# Patient Record
Sex: MALE
[De-identification: names, ages, dates, MRNs, and addresses within clinical notes are randomized per-mention and may not be internally consistent; named-entity substitution may affect disease eponyms.]

---

## 2019-11-08 ENCOUNTER — HOSPITAL ENCOUNTER (EMERGENCY)
Dept: HOSPITAL 97 - ER | Age: 54
Discharge: HOME | End: 2019-11-08
Payer: SELF-PAY

## 2019-11-08 VITALS — OXYGEN SATURATION: 97 % | TEMPERATURE: 97.5 F | DIASTOLIC BLOOD PRESSURE: 94 MMHG | SYSTOLIC BLOOD PRESSURE: 116 MMHG

## 2019-11-08 DIAGNOSIS — G40.509: ICD-10-CM

## 2019-11-08 DIAGNOSIS — M47.896: ICD-10-CM

## 2019-11-08 DIAGNOSIS — S32.9XXA: Primary | ICD-10-CM
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
## 2019-11-08 LAB
ALBUMIN SERPL BCP-MCNC: 3.9 G/DL (ref 3.4–5)
ALP SERPL-CCNC: 100 U/L (ref 45–117)
ALT SERPL W P-5'-P-CCNC: 24 U/L (ref 12–78)
AST SERPL W P-5'-P-CCNC: 13 U/L (ref 15–37)
BUN BLD-MCNC: 12 MG/DL (ref 7–18)
GLUCOSE SERPLBLD-MCNC: 149 MG/DL (ref 74–106)
HCT VFR BLD CALC: 48.6 % (ref 39.6–49)
LYMPHOCYTES # SPEC AUTO: 1 K/UL (ref 0.7–4.9)
PMV BLD: 10.5 FL (ref 7.6–11.3)
POTASSIUM SERPL-SCNC: 4.1 MMOL/L (ref 3.5–5.1)
RBC # BLD: 5.77 M/UL (ref 4.33–5.43)

## 2019-11-08 PROCEDURE — 72125 CT NECK SPINE W/O DYE: CPT

## 2019-11-08 PROCEDURE — 70450 CT HEAD/BRAIN W/O DYE: CPT

## 2019-11-08 PROCEDURE — 99285 EMERGENCY DEPT VISIT HI MDM: CPT

## 2019-11-08 PROCEDURE — 72220 X-RAY EXAM SACRUM TAILBONE: CPT

## 2019-11-08 PROCEDURE — 72100 X-RAY EXAM L-S SPINE 2/3 VWS: CPT

## 2019-11-08 PROCEDURE — 96374 THER/PROPH/DIAG INJ IV PUSH: CPT

## 2019-11-08 PROCEDURE — 36415 COLL VENOUS BLD VENIPUNCTURE: CPT

## 2019-11-08 PROCEDURE — 72170 X-RAY EXAM OF PELVIS: CPT

## 2019-11-08 PROCEDURE — 96361 HYDRATE IV INFUSION ADD-ON: CPT

## 2019-11-08 PROCEDURE — 80053 COMPREHEN METABOLIC PANEL: CPT

## 2019-11-08 PROCEDURE — 85025 COMPLETE CBC W/AUTO DIFF WBC: CPT

## 2019-11-08 PROCEDURE — 93005 ELECTROCARDIOGRAM TRACING: CPT

## 2019-11-08 NOTE — ER
Nurse's Notes                                                                                     

 [facility]                                                                 

Name: Saurav Maradiaga                                                                             

Age: 54 yrs                                                                                       

Sex: Male                                                                                         

: 1965                                                                                   

MRN: [id number]                                                                                   

Arrival Date: 2019                                                                          

Time: 09:22                                                                                       

Account#: [id number]                                                                            

Bed 2                                                                                             

Private MD:                                                                                       

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       

  causes;Fracture of unspecified parts of lumbosacral spine and pelvis;Spondylolysis,             

  lumbar region                                                                                   

                                                                                                  

Presentation:                                                                                     

                                                                                             

09:23 Presenting complaint: EMS states: FALL ONTO SACRUM, WITNESSED SZ TWO MIN S/P FALL WITH  bp  

      LOSS OF CONTINENCE. Care prior to arrival: Oxygen administered. Mechanism of Injury:        

      Fall from standing position. Trauma event details: Injury occurred in the [facility], Injury occurred: at home. Injury occurred: 2019 Injury occurred      

      at: 08:45.                                                                                  

09:23 Acuity: DAVEY 3                                                                           bp  

09:23 Method Of Arrival: EMS: [facility]                                                    bp  

09:48 Transition of care: patient was not received from another setting of care. Onset of     bp  

      symptoms is unknown. Risk Assessment: Do you want to hurt yourself or someone else?         

      Patient reports no desire to harm self or others. Initial Sepsis Screen: Does the           

      patient meet any 2 criteria? No. Patient's initial sepsis screen is negative. Does the      

      patient have a suspected source of infection? No. Patient's initial sepsis screen is        

      negative.                                                                                   

                                                                                                  

Trauma Activation: Not Applicable                                                                 

 Physician: ED Physician; Name: ; Notified At: ; Arrived At:                                      

 Physician: General Surgeon; Name: ; Notified At: ; Arrived At:                                   

 Physician: Radiology; Name: ; Notified At: ; Arrived At:                                         

 Physician: Respiratory; Name: ; Notified At: ; Arrived At:                                       

 Physician: Lab; Name: ; Notified At: ; Arrived At:                                               

                                                                                                  

Historical:                                                                                       

- Allergies:                                                                                      

09:46 No Known Allergies;                                                                     bp  

- Home Meds:                                                                                      

09:46 None [Active];                                                                          bp  

- PMHx:                                                                                           

09:46 None;                                                                                   bp  

                                                                                                  

- Immunization history: Last tetanus immunization: unknown.                                       

- Social history:: Smoking status: Patient/guardian denies using tobacco.                         

- Ebola Screening: : No symptoms or risks identified at this time.                                

- Family history:: not pertinent.                                                                 

                                                                                                  

                                                                                                  

Screenin:31 Abuse screen: Denies threats or abuse. Denies injuries from another. Tuberculosis       bp  

      screening: No symptoms or risk factors identified.                                          

09:49 Nutritional screening: No deficits noted. Fall Risk Fall in past 12 months (25 points). bp  

                                                                                                  

Primary Survey:                                                                                   

09:31 NO uncontrolled hemorrhage observed. Breathing/Chest: Respiratory pattern: regular.     bp  

      Circulation: Skin temperature:. Disability Alert. Exposure/Environment: All clothing        

      and personal items were removed. Forensic evidence collection is not deemed to be           

      indicated at this time. Items placed in patient belonging bag. There is no evidence of      

      uncontrolled external bleeding.                                                             

11:46 Reassessment Airway Airway Patent Breathing/Chest Respiratory pattern Regular           bp  

      Respiratory effort Spontaneous Unlabored.                                                   

                                                                                                  

Secondary Survey:                                                                                 

09:31 HEENT: No deficits noted.                                                               bp  

                                                                                                  

Assessment:                                                                                       

09:23 General: Appears in no apparent distress. comfortable, Behavior is calm, cooperative,   bp  

      appropriate for age. Pain: Complains of pain in buttocks. Neuro: Level of Consciousness     

      is awake, alert, obeys commands, Oriented to person, place, time, situation,                

      Appropriate for age  are equal bilaterally Speech is normal, Facial symmetry           

      appears normal, Pupils are PERRLA. EENT: No deficits noted. Cardiovascular: Rhythm is       

      sinus rhythm. Respiratory: Airway is patent. GI: No signs and/or symptoms were reported     

      involving the gastrointestinal system. : No deficits noted. Derm: No deficits noted.      

      Musculoskeletal: No deficits noted.                                                         

09:44 Pain: Pain currently is 5 out of 10 on a pain scale. Quality of pain is described as    ss  

      aching, tender, Pain began suddenly, after fall Is continuous.                              

10:06 Reassessment: PT TO RADIOLOGY.                                                          bp  

11:09 Reassessment: PT RETURNED FROM RADIOLOGY.                                               bp  

11:45 Reassessment: PT D/C HOME AMBULATORY, DX WITH FALL AND FX OF INFERIOR LUMBOSACRAL SPINE.bp  

                                                                                                  

Vital Signs:                                                                                      

09:31  / 103; Pulse 74; Resp 20; Temp 98.5; Pulse Ox 100% ; Weight 108.86 kg;           bp  

11:08  / 100; Pulse 63; Resp 16; Pulse Ox 98% ;                                         bp  

11:45  / 94; Pulse 65; Resp 16; Temp 97.5; Pulse Ox 97% ;                               bp  

                                                                                                  

Perfecto Coma Score:                                                                               

09:31 Eye Response: spontaneous(4). Verbal Response: oriented(5). Motor Response: obeys       bp  

      commands(6). Total: 15.                                                                     

                                                                                                  

Trauma Score (Adult):                                                                             

09:31 Eye Response: spontaneous(1); Verbal Response: oriented(1); Motor Response: obeys       bp  

      commands(2); Systolic BP: > 89 mm Hg(4); Respiratory Rate: 10 to 29 per min(4); [place]     

      Score: 15; Trauma Score: 12                                                                 

                                                                                                  

ED Course:                                                                                        

09:22 Patient arrived in ED.                                                                  bp  

09:25 Triage completed.                                                                       bp  

09:26 Kuldeep Ruffin MD is Attending Physician.                                             mali 

09:31 Oxygen administration via nasal cannula \T\ 2L/min.                                       bp  

09:31 Patient has correct armband on for positive identification. Bed in low position. Call   bp  

      light in reach. Side rails up X2.                                                           

09:32 EKG done, by EKG tech. reviewed by Kuldeep Ruffin MD.                                   sm3 

09:34 Sobia Fournier, RN is Primary Nurse.                                                    ss  

09:49 Thermoregulation: warm blanket given to patient.                                        bp  

09:49 Arm band placed on.                                                                     bp  

10:12 CT Head C Spine In Process Unspecified.                                                 EDMS

11:01 Lumbar Spine (3 Views) XRAY In Process Unspecified.                                     EDMS

11:01 Sacrum And Coccyx XRAY In Process Unspecified.                                          EDMS

11:01 Pelvis XRAY In Process Unspecified.                                                     EDMS

11:22 Rajiv Valero MD is Referral Physician.                                             mali 

11:23 Uvaldo Fried MD is Referral Physician.                                              mali 

11:44 No provider procedures requiring assistance completed. IV discontinued, intact,         bp  

      bleeding controlled, No redness/swelling at site. Pressure dressing applied.                

                                                                                                  

Administered Medications:                                                                         

09:53 Drug: NS 0.9% 500 ml Route: IV; Rate: bolus; Site: left antecubital;                    bp  

11:44 Follow up: IV Status: Completed infusion; IV Intake: 500ml                              bp  

10:00 Drug: TORadol 30 mg Route: IVP; Site: left antecubital;                                 bp  

11:00 Follow up: Response: Pain is decreased                                                  bp  

                                                                                                  

                                                                                                  

Intake:                                                                                           

09:31 PO: 0ml; Total: 0ml.                                                                    bp  

11:44 IV: 500ml; Total: 500ml.                                                                bp  

                                                                                                  

Output:                                                                                           

09:31 Urine: 0ml; Total: 0ml.                                                                 bp  

                                                                                                  

Outcome:                                                                                          

11:23 Discharge ordered by MD.                                                                mali 

11:45 Discharged to home ambulatory.                                                          bp  

11:45 Condition: stable                                                                           

11:45 Discharge instructions given to patient, Instructed on discharge instructions, follow       

      up and referral plans. medication usage, Demonstrated understanding of instructions,        

      follow-up care, medications, Prescriptions given X 2.                                       

11:46 Patient's length of stay was not longer than 2 hours.                                   bp  

12:14 Patient left the ED.                                                                    bp  

                                                                                                  

Signatures:                                                                                       

Dispatcher [employer]                           EDMS                                                 

Kuldeep Ruffin MD MD cha Smirch, Shelby, RN                      RN   ss                                                   

Jacob Garcia RN                      RN   bp                                                   

Fadumo Butler                              3                                                  

                                                                                                  

Corrections: (The following items were deleted from the chart)                                    

09:39 09:31  / 103; Pulse 74bpm; Resp 20bpm; Pulse Ox 100%; Temp 98.5F; bp              bp  

09:48 09:47 Reassessment Breathing/Chest bp                                                   bp  

                                                                                                  

**************************************************************************************************

## 2019-11-08 NOTE — RAD REPORT
EXAM DESCRIPTION:  RAD - Pelvis - 11/8/2019 10:53 am

 

CLINICAL HISTORY:  PAIN

 

COMPARISON:  No comparisons

 

FINDINGS:  No fracture, dislocation or radiographic evidence of AVN.

 

IMPRESSION:  Negative study.

## 2019-11-08 NOTE — RAD REPORT
EXAM DESCRIPTION:  CT - [id number] - 11/8/2019 10:12 am

 

CLINICAL HISTORY:  Trauma, head and neck injury.

Pain;Weakness

 

COMPARISON:  No comparisons

 

TECHNIQUE:  Axial 5 mm thick images of the head were obtained.

 

Axial 2 mm thick images of the cervical spine were obtained with sagittal and coronal reconstruction 
images generated and reviewed.

 

All CT scans are performed using dose optimization technique as appropriate and may include automated
 exposure control or mA/KV adjustment according to patient size.

 

FINDINGS:  CT HEAD WITHOUT CONTRAST:

 

No acute hemorrhage, hydrocephalus or extra-axial collection is identified.No areas of brain edema or
 midline shift.

 

The paranasal sinuses and mastoids are clear.The calvarium is intact.

 

CT CERVICAL SPINE WITHOUT CONTRAST:

 

No fracture or subluxation.Mild lower cervical spondylosis.No prevertebral soft tissues swelling is i
dentified.

 

IMPRESSION:  No acute intracranial or cervical spine findings.

 

Mild lower cervical degenerative change.

## 2019-11-08 NOTE — RAD REPORT
EXAM DESCRIPTION:  RAD - Sacrum And Coccyx - 11/8/2019 10:53 am

 

CLINICAL HISTORY:  PAIN

 

COMPARISON:  Lumbar Spine 3 Views dated 11/8/2019

 

FINDINGS:  Bilateral spondylolysis with mild anterolisthesis at L5-S1. There is mild offset at the sa
crococcygeal articulation of 5 mm with thin lucency seen in the very inferior aspect of the sacrum, l
ikely related to fracture with mild subluxation.

## 2019-11-08 NOTE — RAD REPORT
EXAM DESCRIPTION:  RAD - Lumbar Spine 3 Views - 11/8/2019 10:53 am

 

CLINICAL HISTORY:  PAIN

Radiculopathy

 

COMPARISON:  No comparisons

 

FINDINGS:  Vertebral body heights appear maintained. No compression fracture noted. Prominent disc th
inning with vacuum disc degeneration noted at L5-S1. Bilateral spondylolysis with grade 1 anterolisth
esis of L5 on S1.

 

Cholecystectomy clips.

 

IMPRESSION:  Moderate L5-S1 spondylosis as detailed.

## 2019-11-08 NOTE — EDPHYS
Physician Documentation                                                                           

 [facility]                                                                 

Name: Saurav Maradiaga                                                                             

Age: 54 yrs                                                                                       

Sex: Male                                                                                         

: 1965                                                                                   

MRN: [id number]                                                                                   

Arrival Date: 2019                                                                          

Time: 09:22                                                                                       

Account#: [id number]                                                                            

Bed 2                                                                                             

Private MD:                                                                                       

ED Physician Kuldeep Ruffin                                                                      

HPI:                                                                                              

                                                                                             

09:52 This 54 yrs old  Male presents to ER via EMS with complaints of Fall Injury.    mali 

09:52 Details of fall: The patient fell from an upright position. Onset: The symptoms/episode mali 

      began/occurred just prior to arrival. Associated injuries: The patient sustained injury     

      to the low back, contusion, pain, pain with movement. Severity of symptoms: At their        

      worst the symptoms were moderate, in the emergency department the symptoms are              

      unchanged. The patient has not experienced similar symptoms in the past.                    

                                                                                                  

Historical:                                                                                       

- Allergies:                                                                                      

09:46 No Known Allergies;                                                                     bp  

- Home Meds:                                                                                      

09:46 None [Active];                                                                          bp  

- PMHx:                                                                                           

09:46 None;                                                                                   bp  

                                                                                                  

- Immunization history: Last tetanus immunization: unknown.                                       

- Social history:: Smoking status: Patient/guardian denies using tobacco.                         

- Ebola Screening: : No symptoms or risks identified at this time.                                

- Family history:: not pertinent.                                                                 

                                                                                                  

                                                                                                  

ROS:                                                                                              

09:52 Constitutional: Negative for fever, chills, and weight loss, Eyes: Negative for injury, mali 

      pain, redness, and discharge, ENT: Negative for injury, pain, and discharge, Neck:          

      Negative for injury, pain, and swelling, Cardiovascular: Negative for chest pain,           

      palpitations, and edema, Respiratory: Negative for shortness of breath, cough,              

      wheezing, and pleuritic chest pain, Abdomen/GI: Negative for abdominal pain, nausea,        

      vomiting, diarrhea, and constipation, : Negative for injury, bleeding, discharge, and     

      swelling, MS/Extremity: Negative for injury and deformity, Neuro: Negative for              

      headache, weakness, numbness, tingling, and seizure, Psych: Negative for depression,        

      anxiety, suicide ideation, homicidal ideation, and hallucinations, Allergy/Immunology:      

      Negative for hives, rash, and allergies, Endocrine: Negative for neck swelling,             

      polydipsia, polyuria, polyphagia, and marked weight changes.                                

09:52 Back: Positive for injury or acute deformity, decreased range of motion, pain at rest,      

      pain with movement.                                                                         

                                                                                                  

Exam:                                                                                             

09:52 Constitutional:  This is a well developed, well nourished patient who is awake, alert,  mali 

      and in no acute distress. Head/Face:  Normocephalic, atraumatic. Eyes:  Pupils equal        

      round and reactive to light, extra-ocular motions intact.  Lids and lashes normal.          

      Conjunctiva and sclera are non-icteric and not injected.  Cornea within normal limits.      

      Periorbital areas with no swelling, redness, or edema. ENT:  Nares patent. No nasal         

      discharge, no septal abnormalities noted.  Tympanic membranes are normal and external       

      auditory canals are clear.  Oropharynx with no redness, swelling, or masses, exudates,      

      or evidence of obstruction, uvula midline.  Mucous membranes moist. Neck:  Trachea          

      midline, no thyromegaly or masses palpated, and no cervical lymphadenopathy.  Supple,       

      full range of motion without nuchal rigidity, or vertebral point tenderness.  No            

      Meningismus. Chest/axilla:  Normal chest wall appearance and motion.  Nontender with no     

      deformity.  No lesions are appreciated. Respiratory:  Lungs have equal breath sounds        

      bilaterally, clear to auscultation and percussion.  No rales, rhonchi or wheezes noted.     

       No increased work of breathing, no retractions or nasal flaring. Abdomen/GI:  Soft,        

      non-tender, with normal bowel sounds.  No distension or tympany.  No guarding or            

      rebound.  No evidence of tenderness throughout. Male :  Normal genitalia with no          

      discharge or lesions. Skin:  Warm, dry with normal turgor.  Normal color with no            

      rashes, no lesions, and no evidence of cellulitis. MS/ Extremity:  Pulses equal, no         

      cyanosis.  Neurovascular intact.  Full, normal range of motion. Neuro:  Awake and           

      alert, GCS 15, oriented to person, place, time, and situation.  Cranial nerves II-XII       

      grossly intact.  Motor strength 5/5 in all extremities.  Sensory grossly intact.            

      Cerebellar exam normal.  Normal gait. Psych:  Awake, alert, with orientation to person,     

      place and time.  Behavior, mood, and affect are within normal limits.                       

09:52 Cardiovascular: Rate: normal, Rhythm: regular, Pulses: no pulse deficits are                

      appreciated.                                                                                

                                                                                                  

Vital Signs:                                                                                      

09:31  / 103; Pulse 74; Resp 20; Temp 98.5; Pulse Ox 100% ; Weight 108.86 kg;           bp  

11:08  / 100; Pulse 63; Resp 16; Pulse Ox 98% ;                                         bp  

11:45  / 94; Pulse 65; Resp 16; Temp 97.5; Pulse Ox 97% ;                               bp  

                                                                                                  

[place] Coma Score:                                                                               

09:31 Eye Response: spontaneous(4). Verbal Response: oriented(5). Motor Response: obeys       bp  

      commands(6). Total: 15.                                                                     

                                                                                                  

Trauma Score (Adult):                                                                             

09:31 Eye Response: spontaneous(1); Verbal Response: oriented(1); Motor Response: obeys       bp  

      commands(2); Systolic BP: > 89 mm Hg(4); Respiratory Rate: 10 to 29 per min(4); [place]     

      Score: 15; Trauma Score: 12                                                                 

                                                                                                  

MDM:                                                                                              

09:26 Patient medically screened.                                                             [facility] 

09:55 Data reviewed: vital signs, nurses notes, lab test result(s), urinalysis, radiologic    mali 

      studies.                                                                                    

                                                                                                  

                                                                                             

09:56 Order name: CBC with Diff; Complete Time: 11:15                                         [facility] 

                                                                                             

09:52 Order name: Lumbar Spine (3 Views) XRAY; Complete Time: 11:15                           [facility] 

                                                                                             

09:52 Order name: Sacrum And Coccyx XRAY; Complete Time: 11:15                                [facility] 

                                                                                             

09:52 Order name: Pelvis XRAY; Complete Time: 11:15                                           [facility] 

                                                                                             

09:56 Order name: Comprehensive Metabolic Panel; Complete Time: 11:15                         [facility] 

                                                                                             

09:56 Order name: CT Head C Spine; Complete Time: 11:15                                       [facility] 

                                                                                             

09:56 Order name: EKG; Complete Time: 09:57                                                   [facility] 

                                                                                             

09:56 Order name: EKG - Nurse/Tech; Complete Time: 10:03                                      [facility] 

                                                                                             

09:56 Order name: Seizure Precautions; Complete Time: 10:03                                   [facility] 

                                                                                                  

Administered Medications:                                                                         

09:53 Drug: NS 0.9% 500 ml Route: IV; Rate: bolus; Site: left antecubital;                    bp  

11:44 Follow up: IV Status: Completed infusion; IV Intake: 500ml                              bp  

10:00 Drug: TORadol 30 mg Route: IVP; Site: left antecubital;                                 bp  

11:00 Follow up: Response: Pain is decreased                                                  bp  

                                                                                                  

                                                                                                  

Disposition:                                                                                      

19 11:23 Discharged to Home. Impression: Fall down embankment (hill), Low back pain,        

  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                    

  of lumbosacral spine and pelvis, Spondylolysis, lumbar region.                                  

- Condition is Stable.                                                                            

- Discharge Instructions: Back Pain, Adult, Musculoskeletal Pain, Nonepileptic                    

  Seizures, Back Pain, Adult, Easy-to-Read.                                                       

- Prescriptions for Ibuprofen 600 mg Oral Tablet - take 1 tablet by ORAL route every 6            

  hours As needed take with food; 30 tablet. Tylenol- Codeine #3 300-30 mg Oral Tablet            

  - take 2 tablet by ORAL route every 6 hours As needed; 30 tablet.                               

- Medication Reconciliation Form, Thank You Letter, Antibiotic Education, Prescription            

  Opioid Use form.                                                                                

- Follow up: Private Physician; When: 2 - 3 days; Reason: Recheck today's complaints,             

  Continuance of care, Re-evaluation by your physician. Follow up: Rajiv Valero;               

  When: 2 - 3 days; Reason: Recheck today's complaints, Continuance of care,                      

  Re-evaluation by your physician. Follow up: Uvaldo Fried MD; When: 2 - 3 days;              

  Reason: Recheck today's complaints, Continuance of care, Re-evaluation by your                  

  physician.                                                                                      

- Problem is new.                                                                                 

- Symptoms have improved.                                                                         

                                                                                                  

                                                                                                  

                                                                                                  

Signatures:                                                                                       

Dispatcher [employer]                           EDMS                                                 

Kuldeep Ruffin MD MD cha Peltier, Brian, RN                      RN   bp                                                   

                                                                                                  

Corrections: (The following items were deleted from the chart)                                    

11:23 11:23 2019 11:23 Discharged to Home. Impression: Fall down embankment (hill); Low mali 

      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     

      of lumbosacral spine and pelvis; Spondylolysis, lumbar region. Condition is Stable.         

      Discharge Instructions: Back Pain, Adult, Musculoskeletal Pain, Nonepileptic Seizures,      

      Back Pain, Adult, Easy-to-Read. Prescriptions for Ibuprofen 600 mg Oral Tablet - take 1     

      tablet by ORAL route every 6 hours As needed take with food; 30 tablet, Tylenol-Codeine     

      #3 300-30 mg Oral Tablet - take 2 tablet by ORAL route every 6 hours As needed; 30          

      tablet. and Forms are Medication Reconciliation Form, Thank You Letter, Antibiotic          

      Education, Prescription Opioid Use. Follow up: Private Physician; When: 2 - 3 days;         

      Reason: Recheck today's complaints, Continuance of care, Re-evaluation by your              

      physician. Follow up: Rajiv Valero; When: 2 - 3 days; Reason: Recheck today's            

      complaints, Continuance of care, Re-evaluation by your physician. Problem is new.           

      Symptoms have improved. mali                                                                 

12:14 11:23 2019 11:23 Discharged to Home. Impression: Fall down embankment (hill); Low bp  

      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     

      of lumbosacral spine and pelvis; Spondylolysis, lumbar region. Condition is Stable.         

      Discharge Instructions: Back Pain, Adult, Musculoskeletal Pain, Nonepileptic Seizures,      

      Back Pain, Adult, Easy-to-Read. Prescriptions for Ibuprofen 600 mg Oral Tablet - take 1     

      tablet by ORAL route every 6 hours As needed take with food; 30 tablet, Tylenol-Codeine     

      #3 300-30 mg Oral Tablet - take 2 tablet by ORAL route every 6 hours As needed; 30          

      tablet. and Forms are Medication Reconciliation Form, Thank You Letter, Antibiotic          

      Education, Prescription Opioid Use. Follow up: Private Physician; When: 2 - 3 days;         

      Reason: Recheck today's complaints, Continuance of care, Re-evaluation by your              

      physician. Follow up: Rajiv Valero; When: 2 - 3 days; Reason: Recheck today's            

      complaints, Continuance of care, Re-evaluation by your physician. Follow up: Uvaldo Fried; When: 2 - 3 days; Reason: Recheck today's complaints, Continuance of care,        

      Re-evaluation by your physician. Problem is new. Symptoms have improved. mali                

                                                                                                  

**************************************************************************************************

## 2019-11-10 NOTE — EKG
Test Date:    2019-11-08               Test Time:    09:25:29

Technician:   MAXIME                                     

                                                     

MEASUREMENT RESULTS:                                       

Intervals:                                           

Rate:         69                                     

VA:           142                                    

QRSD:         86                                     

QT:           428                                    

QTc:          458                                    

Axis:                                                

P:            48                                     

VA:           142                                    

QRS:          47                                     

T:            45                                     

                                                     

INTERPRETIVE STATEMENTS:                                       

                                                     

Normal sinus rhythm

Normal ECG

No previous ECG available for comparison



Electronically Signed On 11-10-19 12:45:54 CST by Mumtaz Castillo